# Patient Record
Sex: MALE | Race: WHITE
[De-identification: names, ages, dates, MRNs, and addresses within clinical notes are randomized per-mention and may not be internally consistent; named-entity substitution may affect disease eponyms.]

---

## 2018-05-06 ENCOUNTER — HOSPITAL ENCOUNTER (EMERGENCY)
Dept: HOSPITAL 17 - NEPD | Age: 28
Discharge: HOME | End: 2018-05-06
Payer: SELF-PAY

## 2018-05-06 VITALS
HEART RATE: 64 BPM | RESPIRATION RATE: 20 BRPM | DIASTOLIC BLOOD PRESSURE: 68 MMHG | OXYGEN SATURATION: 98 % | SYSTOLIC BLOOD PRESSURE: 119 MMHG | TEMPERATURE: 98.5 F

## 2018-05-06 DIAGNOSIS — F10.10: Primary | ICD-10-CM

## 2018-05-06 DIAGNOSIS — Z72.0: ICD-10-CM

## 2018-05-06 DIAGNOSIS — F12.90: ICD-10-CM

## 2018-05-06 PROCEDURE — 99283 EMERGENCY DEPT VISIT LOW MDM: CPT

## 2018-05-06 NOTE — PD
HPI


Chief Complaint:  Chronic alcohol abuse


Time Seen by Provider:  09:25


Travel History


International Travel<30 days:  No


Contact w/Intl Traveler<30days:  No


Traveled to known affect area:  No





History of Present Illness


HPI


27-year-old male came to the emergency room brought by EMS for "detox".  

Patient has been in this emergency room multiple times in past 2 weeks.  He is 

from [facility] and is here visiting his grandparents 

indefinitely.  He says he has been staying with some friends.  He drinks a lot 

and drink last night 15 beers.  Now he feels like he is "detoxing".  Patient 

was completely normal and answering questions appropriately.  Vital signs were 

stable.  Upon asking if he tried any other detox places besides coming to the 

emergency room he said he did not know any place here.  Patient is not 

suicidal.  He says he drinks heavily for past 8-9 years.  Patient appears 

somewhat disheveled.  No pain or any other complaints.





PFSH


Past Medical History


*** Narrative Medical


List of his past medical, surgical, social and family history reviewed from the 

nursing note.


Hx Anticoagulant Therapy:  No


Cardiovascular Problems:  No


Chemotherapy:  No


Cerebrovascular Accident:  No


Diabetes:  No


Diminished Hearing:  No


Hepatitis:  Yes (C)


Respiratory:  No





Social History


Alcohol Use:  Yes ( since a teenager. HX of  blackouts in the past)


Tobacco Use:  Yes


Substance Use:  Yes (marijauna "or anything that makes me feel better")





Allergies-Medications


(Allergen,Severity, Reaction):  


Coded Allergies:  


     haloperidol (Verified  Allergy, Severe, 5/2/18)


 "I can't swallow"


Comments


List of his allergies reviewed from the nursing note


Reported Meds & Prescriptions





Reported Meds & Active Scripts


Active


Active Prescriptions or Reported Medications Unobtainable    





Narrative Medication


List of his home medications reviewed from the nursing note





Review of Systems


Except as stated in HPI:  all other systems reviewed are Neg





Physical Exam


Narrative


GENERAL: Awake, alert, no obvious to


SKIN: Focused skin assessment warm/dry.  Peeling skin on the nose


HEAD: Atraumatic. Normocephalic. 


EYES: Pupils equal and round. No scleral icterus. No injection or drainage. 


ENT: No nasal bleeding or discharge.  Mucous membranes pink and moist.


NECK: Trachea midline. No JVD. 


CARDIOVASCULAR: Regular rate and rhythm.  No murmur appreciated.


RESPIRATORY: No accessory muscle use. Clear to auscultation. Breath sounds 

equal bilaterally. 


GASTROINTESTINAL: Abdomen soft, non-tender, nondistended. Hepatic and splenic 

margins not palpable. 


MUSCULOSKELETAL: No obvious deformities. No clubbing.  No cyanosis.  No edema. 


NEUROLOGICAL: Awake and alert. No obvious cranial nerve deficits.  Motor 

grossly within normal limits. Normal speech.


PSYCHIATRIC: Appropriate mood and affect; insight and judgment normal.





Data


Data


Last Documented VS





Orders





 Orders


Ed Discharge Order (5/6/18 09:31)








MDM


Medical Decision Making


Medical Screen Exam Complete:  Yes


Emergency Medical Condition:  Yes


Medical Record Reviewed:  Yes


Differential Diagnosis


Chronic alcohol abuse, requesting detox


Narrative Course


9:30 AM patient was recommended [facility].  I am medically clearing him.

  He will be discharged.





Procedures


EKG Prior to Arrival:  No





Diagnosis





 Primary Impression:  


 Chronic alcohol abuse





***Additional Instructions:  


Please check and to [facility] for alcohol detox.


Scripts


Unable to Obtain Active Prescriptions or Reported Meds


Disposition:  01 DISCHARGE HOME


Condition:  Stable











uJde Fagan MD May 6, 2018 09:25

## 2018-05-11 ENCOUNTER — HOSPITAL ENCOUNTER (EMERGENCY)
Dept: HOSPITAL 17 - NEDAMB | Age: 28
LOS: 1 days | Discharge: HOME | End: 2018-05-12
Payer: COMMERCIAL

## 2018-05-11 VITALS
RESPIRATION RATE: 18 BRPM | OXYGEN SATURATION: 97 % | HEART RATE: 90 BPM | TEMPERATURE: 98.5 F | DIASTOLIC BLOOD PRESSURE: 70 MMHG | SYSTOLIC BLOOD PRESSURE: 119 MMHG

## 2018-05-11 VITALS — BODY MASS INDEX: 23.15 KG/M2 | WEIGHT: 165.35 LBS | HEIGHT: 71 IN

## 2018-05-11 DIAGNOSIS — F10.24: Primary | ICD-10-CM

## 2018-05-11 DIAGNOSIS — F12.90: ICD-10-CM

## 2018-05-11 DIAGNOSIS — B19.20: ICD-10-CM
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
## 2018-05-11 PROCEDURE — 80307 DRUG TEST PRSMV CHEM ANLYZR: CPT

## 2018-05-11 PROCEDURE — 99283 EMERGENCY DEPT VISIT LOW MDM: CPT

## 2018-05-11 NOTE — PD
HPI


Chief Complaint:  Psychiatric Symptoms


Time Seen by Provider:  22:19


Travel History


International Travel<30 days:  No


Contact w/Intl Traveler<30days:  No


Traveled to known affect area:  No





History of Present Illness


HPI


This is a 27-year-old male presents under Baker act initiated by the Police 

Department.  According to his paperwork the patient was complaining of suicidal 

thoughts intermittently as well as concern for potential alcohol withdrawal as 

well as a negative reaction to ingesting marijuana.  The patient reports a 

history of hepatitis C, long-term alcohol use.  He is from [place], 

lives with his mother in a house there.  He reports that he traveled down to 

[place] 2.5 weeks ago in an effort to visit his grandparents in [place].  He 

reports that he does not get along with his grandparents and they are concerned 

with the amount that he is drinking and thus he is currently homeless and 

staying in [facility] but he plans on returning to [place] at some point.  

Today he reports that he was at the beach and he smoked some marijuana but he 

believes that the marijuana may have had some other substance.  He reports that 

it caused him to feel "weird" and have hallucinations.  He is also complaining 

of passive suicidal thoughts for several weeks as well as concerned that he may 

develop alcohol withdrawal symptoms.  He reports that today he drank 5 beers.  

He has been seen here 5 times in the past 2.5 weeks with similar complaints.  

No other complaints at this time.





PFSH


Past Medical History


Hx Anticoagulant Therapy:  No


Cardiovascular Problems:  No


Chemotherapy:  No


Cerebrovascular Accident:  No


Diabetes:  No


Diminished Hearing:  No


Hepatitis:  Yes (C)


Respiratory:  No





Social History


Alcohol Use:  Yes ( since a teenager. HX of  blackouts in the past)


Tobacco Use:  Yes


Substance Use:  Yes (marijauna "or anything that makes me feel better")





Allergies-Medications


(Allergen,Severity, Reaction):  


Coded Allergies:  


     haloperidol (Verified  Allergy, Severe, 5/2/18)


 "I can't swallow"


Reported Meds & Prescriptions





Reported Meds & Active Scripts


Active


Active Prescriptions or Reported Medications Unobtainable    








Review of Systems


Except as stated in HPI:  all other systems reviewed are Neg





Physical Exam


Narrative


GENERAL: Well-developed well-nourished male in no acute distress sitting 

upright in hospital chair in the "signal 20" room


SKIN: Warm and dry.


HEAD: Atraumatic. Normocephalic. 


EYES: Pupils equal and round. No scleral icterus. No injection or drainage. 


ENT: No nasal bleeding or discharge.  Mucous membranes pink and moist.


NECK: Trachea midline. No JVD. 


CARDIOVASCULAR: Regular rate and rhythm.  No murmur appreciated.


RESPIRATORY: No accessory muscle use. Clear to auscultation. Breath sounds 

equal bilaterally. 


GASTROINTESTINAL: Abdomen soft, non-tender, nondistended. Hepatic and splenic 

margins not palpable. 


MUSCULOSKELETAL: No obvious deformities. No clubbing.  No cyanosis.  No edema. 


NEUROLOGICAL: Awake and alert. No obvious cranial nerve deficits.  Motor 

grossly within normal limits. Normal speech.


PSYCHIATRIC: Appropriate mood and affect; insight and judgment normal.





Data


Data


Last Documented VS





Vital Signs








  Date Time  Temp Pulse Resp B/P (MAP) Pulse Ox O2 Delivery O2 Flow Rate FiO2


 


5/11/18 22:14 98.5 90 18 119/70 (86) 97   








Orders





 Orders


Psych Screen (5/11/18 22:20)


Drug Screen, Random Urine (5/11/18 22:20)


Alcohol (Ethanol) (5/11/18 22:20)








MDM


Medical Decision Making


Medical Screen Exam Complete:  Yes


Emergency Medical Condition:  Yes


Medical Record Reviewed:  Yes


Differential Diagnosis


Homelessness, malingering, alcohol dependence, polysubstance abuse, major 

depressive disorder, adjustment reaction, acute psychosis


Narrative Course


27-year-old male presents under [place] act for psychiatric evaluation.  I 

reviewed his previous visits.  I reviewed his lab work from his past visits.  

He has elevated liver enzymes secondary to hepatitis C.  Typically his alcohol 

level is elevated as well.  His lab work is otherwise unremarkable and will not 

be repeated today.  A drug screen and alcohol level have been ordered. CIWA 

precautions initiated.





Mental health screening discussed with the patient. Psychiatric screen ordered.





The patient is medically cleared for psychiatric disposition.





Diagnosis





 Primary Impression:  


 Medical clearance for psychiatric admission


 Additional Impression:  


 Alcoholism


Scripts


Unable to Obtain Active Prescriptions or Reported Meds











Matias Tapia May 11, 2018 22:24

## 2018-05-12 VITALS
RESPIRATION RATE: 18 BRPM | DIASTOLIC BLOOD PRESSURE: 57 MMHG | SYSTOLIC BLOOD PRESSURE: 112 MMHG | TEMPERATURE: 98.7 F | OXYGEN SATURATION: 99 % | HEART RATE: 54 BPM

## 2018-05-12 NOTE — PD
Physical Exam


Time Seen by Provider:  07:55


Narrative


Please refer to previous providers documentation for details surrounding the 

patient's current visit.





Data


Data


Last Documented VS





Vital Signs








  Date Time  Temp Pulse Resp B/P (MAP) Pulse Ox O2 Delivery O2 Flow Rate FiO2


 


5/12/18 06:49 98.7 54 18 112/57 (75) 99 Room Air  








Orders





 Orders


Psych Screen (5/11/18 22:20)


Drug Screen, Random Urine (5/11/18 22:20)


Alcohol (Ethanol) (5/11/18 22:20)


Alcohol Withdrawal Asmt-Ciwa ONCE (5/11/18 22:20)


Flumazenil Inj (Romazicon Inj) (5/11/18 22:30)


Lorazepam (Ativan) (5/11/18 22:30)


Lorazepam Inj (Ativan Inj) (5/11/18 22:30)


Lorazepam (Ativan) (5/11/18 22:30)


Lorazepam Inj (Ativan Inj) (5/11/18 22:30)


Diet Regular Basic (5/12/18 Breakfast)


Ed Discharge Order (5/12/18 07:54)





Labs





Laboratory Tests








Test


  5/11/18


22:30 5/11/18


23:25


 


Ethyl Alcohol Level 40 MG/DL  


 


Urine Opiates Screen  NEG 


 


Urine Barbiturates Screen  NEG 


 


Urine Amphetamines Screen  NEG 


 


Urine Benzodiazepines Screen  NEG 


 


Urine Cocaine Screen  NEG 


 


Urine Cannabinoids Screen  POS 











MDM


Medical Record Reviewed:  Yes


Supervised Visit with ZEESHAN:  No


Narrative Course


Patient has been seen and evaluated, medically clear, and evaluated by 

psychiatry.  With no further medical needs, patient is discharged at this time.


Diagnosis





 Primary Impression:  


 Alcohol-induced mood disorder


Referrals:  


[facility] (Out patient)


Patient Instructions:  Abuse of Alcohol (ED), General Instructions





***Additional Instruction:  


Follow-up with a primary care provider


Consume alcohol in moderation


Return immediately with acute worsening symptoms


Scripts


Unable to Obtain Active Prescriptions or Reported Meds


Disposition:  01 DISCHARGE HOME


Condition:  Stable











Deana Lopes CLIFTON May 12, 2018 07:56

## 2018-05-12 NOTE — PD
__________________________________________________





History of Present Illness


Chief Complaint:  Psychiatric Symptoms





Travel History


International Travel<30 Days:  No


Contact w/Intl Traveler<30days:  No


Known affected area:  No





Legal Status


Legal Status:  Baker Act


Baker Act Signed By:  Tri[facility]havna Driver





[facility]


Past Medical History


Hx Anticoagulant Therapy:  No


Cardiovascular Problems:  No


Chemotherapy:  No


Cerebrovascular Accident:  No


Diabetes:  No


Diminished Hearing:  No


Hepatitis:  Yes (C)


Respiratory:  No





Psychiatric History


Psychiatric History


Hx Psychiatric Treatment:  


Denies any. No previous  suiiced attempts.


History of Inpatient Treatment:  No





Social History


Hx Alcohol Use:  Yes ( since a teenager. HX of  blackouts in the past)


Hx Tobacco Use:  Yes


Hx Substance Use:  Yes


Substance Use Type:  Alcohol, Marijuana


Hx of Substance Use Treatment:  No





Allergies-Medications


(Allergen,Severity, Reaction):  


Coded Allergies:  


     haloperidol (Verified  Allergy, Severe, 5/2/18)


 "I can't swallow"


Reported Meds & Prescriptions





Reported Meds & Active Scripts


Active


Active Prescriptions or Reported Medications Unobtainable





Mental Status Examination


Appearance:  Disheveled


Consciousness:  Alert


Orientation:  x4


Motor Activity:  Normal gait


Speech:  Unremarkable


Language:  Adequate


Fund of Knowledge:  Adequate


Attention and Concentration:  Adequate


Memory:  Unremarkable


Mood:  Sad, Irritable


Affect:  Flat, Blunt


Thought Process & Associations:  Intact


Thought Content:  Appropriate


Hallucination Type:  None


Delusion Type:  None


Suicidal Ideation:  No


Suicidal Plan:  No


Suicidal Intention:  No


Homicidal Ideation:  No


Homicidal Plan:  No


Homicidal Intention:  No


Insight:  Adequate


Judgment:  Adequate





MDM


Orders





 Orders


Psych Screen (5/11/18 22:20)


Drug Screen, Random Urine (5/11/18 22:20)


Alcohol (Ethanol) (5/11/18 22:20)


Alcohol Withdrawal Asmt-Ciwa ONCE (5/11/18 22:20)


Flumazenil Inj (Romazicon Inj) (5/11/18 22:30)


Lorazepam (Ativan) (5/11/18 22:30)


Lorazepam Inj (Ativan Inj) (5/11/18 22:30)


Lorazepam (Ativan) (5/11/18 22:30)


Lorazepam Inj (Ativan Inj) (5/11/18 22:30)


Diet Regular Basic (5/12/18 Breakfast)





Results





Vital Signs








  Date Time  Temp Pulse Resp B/P (MAP) Pulse Ox O2 Delivery O2 Flow Rate FiO2


 


5/12/18 06:49 98.7 54 18 112/57 (75) 99 Room Air  


 


5/11/18 22:14 98.5 90 18 119/70 (86) 97   











Laboratory Tests








Test


  5/11/18


22:30 5/11/18


23:25


 


Ethyl Alcohol Level 40  


 


Urine Opiates Screen  NEG 


 


Urine Barbiturates Screen  NEG 


 


Urine Amphetamines Screen  NEG 


 


Urine Benzodiazepines Screen  NEG 


 


Urine Cocaine Screen  NEG 


 


Urine Cannabinoids Screen  POS 











Diagnosis





 Primary Impression:  


 Alcohol-induced mood disorder


Departure Forms:  Tests/Procedures


Patient Instructions:  General Instructions





Additional Instructions:  


Follow up with the [facility] at [address] [place] [address] [phone] for 

outpatient alcohol/drug treatment. Follow up at [facility] in [place] if inpatient detox is desired.


Prescriptions


Unable to Obtain Active Prescriptions or Reported Meds


Disposition:  01 DISCHARGE HOME


Condition:  Stable











Lolis Medrano May 12, 2018 08:00

## 2018-05-12 NOTE — PD
__________________________________________________





History of Present Illness


Chief Complaint:  Psychiatric Symptoms


Time Seen by Provider:  07:15


Travel History


International Travel<30 Days:  No


Contact w/Intl Traveler<30days:  No


Known affected area:  No





Legal Status


Legal Status:  Baker Act


Baker Act Signed By:  [facility] 


History of Present Illness:


Patient is a 26y/o  male , single, no children from [place]. 

Present to the Emergency Department under a Baker Act from [facility]'s Department, which states "made contact with Tasneem after he called 

into dispatch advising that he was suffering from the symptoms of alcohol 

withdrawal and having a negative reaction to recently ingesting marijuana.  

During contact with Tasneem, he advised to feeding pain that he was having 

suicidal thoughts on and off for a long time.  Tasneem advised that he is having 

complications from hepatitis C and that along with his addiction is causing him 

to have suicidal thoughts.  Deputy Walker asked Tasneem if he was left alone 

tonight, if he would perhaps kill himself.  Advise advised yes Tasneem was 

placed into protective custody under the Baker act and [facility]." Patient has been in  [place] for 2 1/2 weeks to visit his 

grandmother in [place].  He states that he has been drinking since the age of 

15 year of age and is drinking approximately 10 tall beers per day or more if 

he has access.  He has no mental health history. Endorses that his father is an 

alcoholic and lives local, but his parents to not want to help him.  He smokes, 

only drug is marijuana and is UDS is positive for marijuana.  He has been to 

[facility] for the assistance with his alcoholism x2 within the past two weeks 

prior to today.  He endorses no suicidal  or homicidal ideations at this time. 





Chart reviewed and patient discussed with CHE Amador.  Patient is in J-106. he 

is unkempt and disheveled. Alert and oriented .Gait is steady and normal.  

Attitude is cooperative. Speech is normal for rate, tone and volume.  Affect is 

sad, flat and blunted.  Mood is irritable. Thought process is normal and 

logical.  Thought association is relevant and he thinking is concrete.  Fund of 

knowledge is average . Concentration and attention is adequate.  Endorses no 

suicidal or homicidal ideations. 





Patient is at low risk for self harm.  He endorses that he is an alcoholic and 

that he wants to stop drinking.  He states that he has attempted to stop 

drinking in the past but is inconsistent in his efforts. He is knowledgeable 

about AA and the 12 step program.  He is articulate and is insight to what he 

needs to do to become sober.  Based on the patient's presentation and desire to 

become sober will give him information on local resources and lift the [place] 

Act. 





CHE Amador called [facility] to see if there is a Detox bed and there is no 

availability at this time. Will refer the patient to [facility] , [address] [place] [address] in [place] , [phone] and give him information 

regarding how to get on the list for Detox at [facility].  

















Dx:  Mood Disorder, Alcohol induced.





PFSH


Past Medical History


Hx Anticoagulant Therapy:  No


Cardiovascular Problems:  No


Chemotherapy:  No


Cerebrovascular Accident:  No


Diabetes:  No


Diminished Hearing:  No


Hepatitis:  Yes (C)


Respiratory:  No





Psychiatric History


Psychiatric History


No history of mental illness or care for mental illness.


Hx Psychiatric Treatment:  


Denies any. No previous  suiiced attempts.


History of Inpatient Treatment:  No





Social History


Hx Alcohol Use:  Yes ( since a teenager. HX of  blackouts in the past)


Hx Tobacco Use:  Yes


Hx Substance Use:  Yes


Substance Use Type:  Alcohol, Marijuana


Hx of Substance Use Treatment:  No





Allergies-Medications


(Allergen,Severity, Reaction):  


Coded Allergies:  


     haloperidol (Verified  Allergy, Severe, 5/2/18)


 "I can't swallow"


Reported Meds & Prescriptions





Reported Meds & Active Scripts


Active


Active Prescriptions or Reported Medications Unobtainable





Mental Status Examination


Appearance:  Disheveled


Consciousness:  Alert


Orientation:  x4


Motor Activity:  Normal gait


Speech:  Unremarkable


Language:  Adequate


Fund of Knowledge:  Adequate


Attention and Concentration:  Adequate


Memory:  Unremarkable


Mood:  Sad, Irritable


Affect:  Flat, Blunt


Thought Process & Associations:  Intact


Thought Content:  Appropriate


Hallucination Type:  None


Delusion Type:  None


Suicidal Ideation:  No


Suicidal Plan:  No


Suicidal Intention:  No


Homicidal Ideation:  No


Homicidal Plan:  No


Homicidal Intention:  No


Insight:  Adequate


Judgment:  Adequate





MDM


Medical Decision Making


Medical Record Reviewed:  Yes


Assessment/Plan


Patient is a 26 y/o male who has been in [place] for 2 [place]2 weeks from [place] 

[place]. He has a long history of alcohol abuse since age 15 and is seeking 

treatment .  He is currently drinking 10 tall beers per day.  He has parents 

locally and a grandmother in [place], but they are not longer supporting him.  

He has no history of mental illness. He is currently homeless.   He endorses no 

suicidal ideations and is seeking help to stop drinking.  At this time their is 

no Detox bed available. Patient is stable. Will lift the Bake Act and provide 

information on the [facility] Program to seek help as an outpatient until he can 

secure inpatient treatment.  He will be provided information on the Detox 

program at [facility] as well.





Orders





 Orders


Psych Screen (5/11/18 22:20)


Drug Screen, Random Urine (5/11/18 22:20)


Alcohol (Ethanol) (5/11/18 22:20)


Alcohol Withdrawal Asmt-Ciwa ONCE (5/11/18 22:20)


Flumazenil Inj (Romazicon Inj) (5/11/18 22:30)


Lorazepam (Ativan) (5/11/18 22:30)


Lorazepam Inj (Ativan Inj) (5/11/18 22:30)


Lorazepam (Ativan) (5/11/18 22:30)


Lorazepam Inj (Ativan Inj) (5/11/18 22:30)


Diet Regular Basic (5/12/18 Breakfast)





Results





Vital Signs








  Date Time  Temp Pulse Resp B/P (MAP) Pulse Ox O2 Delivery O2 Flow Rate FiO2


 


5/12/18 06:49 98.7 54 18 112/57 (75) 99 Room Air  


 


5/11/18 22:14 98.5 90 18 119/70 (86) 97   











Laboratory Tests








Test


  5/11/18


22:30 5/11/18


23:25


 


Ethyl Alcohol Level 40  


 


Urine Opiates Screen  NEG 


 


Urine Barbiturates Screen  NEG 


 


Urine Amphetamines Screen  NEG 


 


Urine Benzodiazepines Screen  NEG 


 


Urine Cocaine Screen  NEG 


 


Urine Cannabinoids Screen  POS 











Diagnosis





 Primary Impression:  


 Alcohol-induced mood disorder


Prescriptions


Unable to Obtain Active Prescriptions or Reported Meds











Lolis Medrano May 12, 2018 07:51

## 2018-06-04 ENCOUNTER — HOSPITAL ENCOUNTER (EMERGENCY)
Dept: HOSPITAL 17 - NEPD | Age: 28
LOS: 1 days | Discharge: HOME | End: 2018-06-05
Payer: SELF-PAY

## 2018-06-04 VITALS — BODY MASS INDEX: 23.89 KG/M2 | HEIGHT: 72 IN | WEIGHT: 176.37 LBS

## 2018-06-04 VITALS
HEART RATE: 65 BPM | TEMPERATURE: 98.3 F | RESPIRATION RATE: 16 BRPM | SYSTOLIC BLOOD PRESSURE: 119 MMHG | OXYGEN SATURATION: 99 % | DIASTOLIC BLOOD PRESSURE: 71 MMHG

## 2018-06-04 VITALS
HEART RATE: 71 BPM | OXYGEN SATURATION: 99 % | DIASTOLIC BLOOD PRESSURE: 75 MMHG | SYSTOLIC BLOOD PRESSURE: 133 MMHG | RESPIRATION RATE: 16 BRPM

## 2018-06-04 DIAGNOSIS — L03.115: Primary | ICD-10-CM

## 2018-06-04 DIAGNOSIS — F17.200: ICD-10-CM

## 2018-06-04 PROCEDURE — 86403 PARTICLE AGGLUT ANTBDY SCRN: CPT

## 2018-06-04 PROCEDURE — 99283 EMERGENCY DEPT VISIT LOW MDM: CPT

## 2018-06-04 PROCEDURE — 87186 SC STD MICRODIL/AGAR DIL: CPT

## 2018-06-04 PROCEDURE — 87070 CULTURE OTHR SPECIMN AEROBIC: CPT

## 2018-06-05 NOTE — PD
HPI


Chief Complaint:  Skin Problem


Time Seen by Provider:  23:55


Travel History


International Travel<30 days:  No


Contact w/Intl Traveler<30days:  No


Traveled to known affect area:  No





History of Present Illness


HPI


27-year-old male with right foot pain.  He reports that he wears sandals and he 

developed a blister on the dorsum of his right foot 1.5 weeks ago.  The blister 

popped and he has developed some redness and pain at the site of the blister.  

Pain is aching, constant, worse with palpation or when wearing shoes.  No 

relieving factors.  No fevers or chills.  No associated signs or symptoms.  No 

other complaints.





PFSH


Past Medical History


Hx Anticoagulant Therapy:  No


Cardiovascular Problems:  No


Chemotherapy:  No


Cerebrovascular Accident:  No


Diabetes:  No


Patient Takes Glucophage:  No


Diminished Hearing:  No


Hepatitis:  Yes (C)


Respiratory:  No


Immunizations Current:  Yes


Tetanus Vaccination:  < 5 Years


Influenza Vaccination:  No





Social History


Alcohol Use:  Yes ( since a teenager. HX of  blackouts in the past)


Tobacco Use:  Yes (1PPD)


Substance Use:  Yes





Allergies-Medications


(Allergen,Severity, Reaction):  


Coded Allergies:  


     haloperidol (Verified  Allergy, Severe, 6/4/18)


 "I can't swallow"


Reported Meds & Prescriptions





Reported Meds & Active Scripts


Active


Keflex (Cephalexin) 500 Mg Cap 500 Mg PO Q8H


Bactrim DS (Sulfamethoxazole-Trimethoprim) 800-160 Mg Tab 1 Tab PO BID








Review of Systems


Except as stated in HPI:  all other systems reviewed are Neg





Physical Exam


Narrative


GENERAL: Well-developed well-nourished male in no acute distress


SKIN: Warm and dry.  On the dorsum of the right foot there is an area of 

excoriation with some surrounding erythema.  There is some bloody drainage with 

palpation but no purulent drainage.  There is no induration or fluctuance.


HEAD: Atraumatic. Normocephalic. 


EYES: Pupils equal and round. No scleral icterus. No injection or drainage. 


ENT: No nasal bleeding or discharge.  Mucous membranes pink and moist.


NECK: Trachea midline. No JVD. 


CARDIOVASCULAR: Regular rate and rhythm.  No murmur appreciated.


RESPIRATORY: No accessory muscle use. Clear to auscultation. Breath sounds 

equal bilaterally. 


GASTROINTESTINAL: Abdomen soft, non-tender, nondistended. Hepatic and splenic 

margins not palpable. 


MUSCULOSKELETAL: No obvious deformities.  No edema. 


NEUROLOGICAL: Awake and alert. No obvious cranial nerve deficits.  Motor 

grossly within normal limits. Normal speech.





Data


Data


Last Documented VS





Vital Signs








  Date Time  Temp Pulse Resp B/P (MAP) Pulse Ox O2 Delivery O2 Flow Rate FiO2


 


6/4/18 23:55  71 16 133/75 (94) 99 Room Air  


 


6/4/18 19:31 98.3       








Orders





 Orders


Sulfamet-Trimeth Ds 800-160 Mg (Bactrim (6/5/18 00:00)


Cephalexin (Keflex) (6/5/18 00:00)


Ed Discharge Order (6/5/18 00:02)


Wound Culture And Gram Stain (6/5/18 00:04)








[facility]


Medical Decision Making


Medical Screen Exam Complete:  Yes


Emergency Medical Condition:  Yes


Medical Record Reviewed:  Yes


Differential Diagnosis


Cellulitis, abscess, osteomyelitis


Narrative Course


Examination is consistent with mild cellulitis on the dorsum right foot.  Wound 

culture performed.  The patient is being started on Bactrim and Keflex.  

Discussed signs and symptoms that would warrant returning to the emergency room.





Diagnosis





 Primary Impression:  


 Cellulitis of right foot





***Additional Instructions:  


Medication as prescribed.  Warm compresses several times a day.  Return for new 

or worsening symptoms.


***Med/Other Pt SpecificInfo:  Prescription(s) given


Scripts


Cephalexin (Keflex) 500 Mg Cap


500 MG PO Q8H for Infection, #30 CAP 0 Refills


   Prov: Jazmin Dias DO         6/5/18 


Sulfamethoxazole-Trimethoprim (Bactrim DS) 800-160 Mg Tab


1 TAB PO BID for Infection, #20 TAB 0 Refills


   Prov: Jazmin Dias DO         6/5/18


Disposition:  01 DISCHARGE HOME


Condition:  Stable











Matias Tapia Jun 5, 2018 00:20

## 2018-06-20 ENCOUNTER — HOSPITAL ENCOUNTER (EMERGENCY)
Dept: HOSPITAL 17 - NEPD | Age: 28
LOS: 2 days | Discharge: HOME | End: 2018-06-22
Payer: SELF-PAY

## 2018-06-20 VITALS
RESPIRATION RATE: 16 BRPM | TEMPERATURE: 98 F | OXYGEN SATURATION: 100 % | SYSTOLIC BLOOD PRESSURE: 137 MMHG | HEART RATE: 87 BPM | DIASTOLIC BLOOD PRESSURE: 77 MMHG

## 2018-06-20 VITALS
OXYGEN SATURATION: 100 % | DIASTOLIC BLOOD PRESSURE: 84 MMHG | HEART RATE: 92 BPM | SYSTOLIC BLOOD PRESSURE: 130 MMHG | RESPIRATION RATE: 18 BRPM

## 2018-06-20 VITALS — RESPIRATION RATE: 16 BRPM

## 2018-06-20 VITALS — HEIGHT: 60 IN | WEIGHT: 176.37 LBS | BODY MASS INDEX: 34.63 KG/M2

## 2018-06-20 DIAGNOSIS — F60.9: ICD-10-CM
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
## 2018-06-20 DIAGNOSIS — F10.229: ICD-10-CM

## 2018-06-20 DIAGNOSIS — B19.20: ICD-10-CM

## 2018-06-20 DIAGNOSIS — Z59.0: ICD-10-CM

## 2018-06-20 DIAGNOSIS — F17.200: ICD-10-CM

## 2018-06-20 DIAGNOSIS — F15.229: Primary | ICD-10-CM

## 2018-06-20 DIAGNOSIS — R45.851: ICD-10-CM

## 2018-06-20 DIAGNOSIS — F32.9: ICD-10-CM

## 2018-06-20 LAB
ALBUMIN SERPL-MCNC: 4.5 GM/DL (ref 3.4–5)
ALP SERPL-CCNC: 74 U/L (ref 45–117)
ALT SERPL-CCNC: 280 U/L (ref 12–78)
AST SERPL-CCNC: 148 U/L (ref 15–37)
BASOPHILS # BLD AUTO: 0 TH/MM3 (ref 0–0.2)
BASOPHILS NFR BLD: 0.2 % (ref 0–2)
BILIRUB SERPL-MCNC: 0.6 MG/DL (ref 0.2–1)
BUN SERPL-MCNC: 7 MG/DL (ref 7–18)
CALCIUM SERPL-MCNC: 9.4 MG/DL (ref 8.5–10.1)
CHLORIDE SERPL-SCNC: 99 MEQ/L (ref 98–107)
CREAT SERPL-MCNC: 0.94 MG/DL (ref 0.6–1.3)
EOSINOPHIL # BLD: 0.1 TH/MM3 (ref 0–0.4)
EOSINOPHIL NFR BLD: 1 % (ref 0–4)
ERYTHROCYTE [DISTWIDTH] IN BLOOD BY AUTOMATED COUNT: 14.4 % (ref 11.6–17.2)
GFR SERPLBLD BASED ON 1.73 SQ M-ARVRAT: 96 ML/MIN (ref 89–?)
GLUCOSE SERPL-MCNC: 86 MG/DL (ref 74–106)
GLUCOSE UR STRIP-MCNC: (no result) MG/DL
HCO3 BLD-SCNC: 22.2 MEQ/L (ref 21–32)
HCT VFR BLD CALC: 39.9 % (ref 39–51)
HGB BLD-MCNC: 13.4 GM/DL (ref 13–17)
HGB UR QL STRIP: (no result)
KETONES UR STRIP-MCNC: (no result) MG/DL
LYMPHOCYTES # BLD AUTO: 2.1 TH/MM3 (ref 1–4.8)
LYMPHOCYTES NFR BLD AUTO: 29.2 % (ref 9–44)
MCH RBC QN AUTO: 31.5 PG (ref 27–34)
MCHC RBC AUTO-ENTMCNC: 33.6 % (ref 32–36)
MCV RBC AUTO: 93.6 FL (ref 80–100)
MONOCYTE #: 0.5 TH/MM3 (ref 0–0.9)
MONOCYTES NFR BLD: 6.8 % (ref 0–8)
NEUTROPHILS # BLD AUTO: 4.4 TH/MM3 (ref 1.8–7.7)
NEUTROPHILS NFR BLD AUTO: 62.8 % (ref 16–70)
NITRITE UR QL STRIP: (no result)
PLATELET # BLD: 216 TH/MM3 (ref 150–450)
PMV BLD AUTO: 7.6 FL (ref 7–11)
PROT SERPL-MCNC: 8.6 GM/DL (ref 6.4–8.2)
RBC # BLD AUTO: 4.26 MIL/MM3 (ref 4.5–5.9)
SODIUM SERPL-SCNC: 133 MEQ/L (ref 136–145)
SP GR UR STRIP: 1 (ref 1–1.03)
URINE LEUKOCYTE ESTERASE: (no result)
WBC # BLD AUTO: 7 TH/MM3 (ref 4–11)

## 2018-06-20 PROCEDURE — 80053 COMPREHEN METABOLIC PANEL: CPT

## 2018-06-20 PROCEDURE — 84443 ASSAY THYROID STIM HORMONE: CPT

## 2018-06-20 PROCEDURE — 85025 COMPLETE CBC W/AUTO DIFF WBC: CPT

## 2018-06-20 PROCEDURE — 99283 EMERGENCY DEPT VISIT LOW MDM: CPT

## 2018-06-20 PROCEDURE — 96372 THER/PROPH/DIAG INJ SC/IM: CPT

## 2018-06-20 PROCEDURE — 80307 DRUG TEST PRSMV CHEM ANLYZR: CPT

## 2018-06-20 PROCEDURE — 81001 URINALYSIS AUTO W/SCOPE: CPT

## 2018-06-20 SDOH — ECONOMIC STABILITY - HOUSING INSECURITY: HOMELESSNESS: Z59.0

## 2018-06-20 NOTE — PD
HPI


Chief Complaint:  Suicide Ideation/Attempt


Time Seen by Provider:  13:10


Travel History


International Travel<30 days:  No


Contact w/Intl Traveler<30days:  No


Traveled to known affect area:  No





History of Present Illness


HPI


27-year-old  male presents emergency department voluntarily with 

history of drinking alcohol and smoking meth for the past week.  Patient now 

feels he is hallucinating, and feeling suicidal or homicidal.  Patient denies 

any illness or medical issues otherwise.  He is here for help and wants to get 

clean.  He has no specific plan.  He is cooperative.  He is allergic to 

haloperidol





PFSH


Past Medical History


Hx Anticoagulant Therapy:  No


Cardiovascular Problems:  No


Chemotherapy:  No


Cerebrovascular Accident:  No


Diabetes:  No


Diminished Hearing:  No


Hepatitis:  Yes (C)


Respiratory:  No


Immunizations Current:  Yes





Social History


Alcohol Use:  Yes ( since a teenager. HX of  blackouts in the past)


Tobacco Use:  Yes (1PPD)


Substance Use:  Yes





Allergies-Medications


(Allergen,Severity, Reaction):  


Coded Allergies:  


     haloperidol (Verified  Allergy, Severe, 6/4/18)


 "I can't swallow"


Reported Meds & Prescriptions





Reported Meds & Active Scripts


Active


Keflex (Cephalexin) 500 Mg Cap 500 Mg PO Q8H


Bactrim DS (Sulfamethoxazole-Trimethoprim) 800-160 Mg Tab 1 Tab PO BID








Review of Systems


Except as stated in HPI:  all other systems reviewed are Neg


General / Constitutional:  No: Fever


Eyes:  No: Visual changes


HENT:  No: Headaches


Cardiovascular:  No: Chest Pain or Discomfort


Respiratory:  No: Shortness of Breath


Gastrointestinal:  No: Abdominal Pain


Genitourinary:  No: Dysuria


Musculoskeletal:  No: Pain


Skin:  No Rash


Neurologic:  No: Weakness


Psychiatric:  No: Depression


Endocrine:  No: Polydipsia


Hematologic/Lymphatic:  No: Easy Bruising





Physical Exam


Narrative


GENERAL: Patient is alert and oriented 3, and cooperative.


SKIN: Warm and dry.  Normal color.  Normal turgor.  No signs of trauma or drug 

use.


HEAD: Atraumatic. Normocephalic. 


EYES: Pupils equal and round. No scleral icterus. No injection or drainage. 


ENT: No nasal bleeding or discharge.  Mucous membranes pink and moist.  Pharynx 

is clear.  Airways patent


NECK: Trachea midline.  Supple and nontender.


CARDIOVASCULAR: Regular rate and rhythm.  No murmurs gallops or rubs.


RESPIRATORY: No accessory muscle use. Clear to auscultation. Breath sounds 

equal bilaterally. 


GASTROINTESTINAL: Abdomen soft, non-tender, nondistended. Hepatic and splenic 

margins not palpable. 


MUSCULOSKELETAL: Extremities without clubbing, cyanosis, or edema. No obvious 

deformities. 


NEUROLOGICAL: Awake and alert. No obvious cranial nerve deficits.  Motor 

grossly within normal limits. Five out of 5 muscle strength in the arms and 

legs.  Normal speech.


PSYCHIATRIC: Appropriate mood and affect; insight and judgment normal.





Data


Data


Last Documented VS





Vital Signs








  Date Time  Temp Pulse Resp B/P (MAP) Pulse Ox O2 Delivery O2 Flow Rate FiO2


 


6/20/18 13:00 98.0 87 16 137/77 (97) 100   








Orders





 Orders


Complete Blood Count With Diff (6/20/18 13:10)


Comprehensive Metabolic Panel (6/20/18 13:10)


Thyroid Stimulating Hormone (6/20/18 13:10)


Urinalysis - C+S If Indicated (6/20/18 13:10)


Psych Screen (6/20/18 13:10)


Drug Screen, Random Urine (6/20/18 13:10)


Alcohol (Ethanol) (6/20/18 13:10)





MDM


Medical Decision Making


Medical Screen Exam Complete:  Yes


Emergency Medical Condition:  Yes


Medical Record Reviewed:  Yes


Differential Diagnosis


Polysubstance abuse.  Suicidal ideation.  Hallucination.


Narrative Course


Patient is cooperative.


Labs ordered per psychiatric protocol.


Patient is medically cleared for psychiatric evaluation.


Psych screen is ordered.











Benton Michael Jun 20, 2018 13:22

## 2018-06-21 VITALS
DIASTOLIC BLOOD PRESSURE: 61 MMHG | RESPIRATION RATE: 18 BRPM | SYSTOLIC BLOOD PRESSURE: 118 MMHG | HEART RATE: 86 BPM | OXYGEN SATURATION: 100 %

## 2018-06-21 VITALS
OXYGEN SATURATION: 98 % | SYSTOLIC BLOOD PRESSURE: 94 MMHG | RESPIRATION RATE: 16 BRPM | DIASTOLIC BLOOD PRESSURE: 47 MMHG | HEART RATE: 56 BPM

## 2018-06-21 VITALS
RESPIRATION RATE: 16 BRPM | OXYGEN SATURATION: 99 % | HEART RATE: 50 BPM | DIASTOLIC BLOOD PRESSURE: 53 MMHG | SYSTOLIC BLOOD PRESSURE: 112 MMHG

## 2018-06-21 VITALS
OXYGEN SATURATION: 99 % | RESPIRATION RATE: 18 BRPM | HEART RATE: 68 BPM | SYSTOLIC BLOOD PRESSURE: 95 MMHG | DIASTOLIC BLOOD PRESSURE: 50 MMHG

## 2018-06-21 VITALS
HEART RATE: 55 BPM | TEMPERATURE: 97 F | RESPIRATION RATE: 16 BRPM | OXYGEN SATURATION: 100 % | SYSTOLIC BLOOD PRESSURE: 98 MMHG | DIASTOLIC BLOOD PRESSURE: 50 MMHG

## 2018-06-22 ENCOUNTER — HOSPITAL ENCOUNTER (EMERGENCY)
Dept: HOSPITAL 17 - NED | Age: 28
Discharge: LEFT BEFORE DISCHARGE | End: 2018-06-22
Payer: SELF-PAY

## 2018-06-22 VITALS — WEIGHT: 165.35 LBS | BODY MASS INDEX: 22.4 KG/M2 | HEIGHT: 72 IN

## 2018-06-22 VITALS
SYSTOLIC BLOOD PRESSURE: 116 MMHG | DIASTOLIC BLOOD PRESSURE: 66 MMHG | HEART RATE: 99 BPM | OXYGEN SATURATION: 97 % | TEMPERATURE: 98.4 F | RESPIRATION RATE: 15 BRPM

## 2018-06-22 VITALS
DIASTOLIC BLOOD PRESSURE: 60 MMHG | RESPIRATION RATE: 18 BRPM | SYSTOLIC BLOOD PRESSURE: 94 MMHG | HEART RATE: 63 BPM | OXYGEN SATURATION: 98 %

## 2018-06-22 DIAGNOSIS — F99: Primary | ICD-10-CM

## 2018-06-22 PROCEDURE — 99281 EMR DPT VST MAYX REQ PHY/QHP: CPT

## 2018-06-22 NOTE — PD.PSY.CON
Provisional Diagnosis


Admission Date





Axis I.


Polysubstance dependence including alcohol, cannabis, amphetamines, history of 

depression


Axis II.


Unspecified personality disorder, rule out antisocial


Axis III.


Hepatitis C





History of Present Illness


Service


Psychiatry


Consult Requested By


ER


Reason for Consult


Suicidal ideation


Primary Care Physician


No Primary Care Physician


HPI


The patient is 27-year-old  man, homeless, with poor family and social 

support, unemployed, with psychiatric history of polysubstance dependence 

including alcohol, cannabis, amphetamines, previous psychiatric hospitalizations

, multiple ER visits in the context of acute substance intoxication, medical 

history of hepatitis C, who presents emergency department voluntarily with 

history of drinking alcohol and smoking meth for the past week.  Patient now 

feels he is hallucinating, and feeling suicidal or homicidal.  Patient denies 

any illness or medical issues otherwise.  He is here for help and wants to get 

clean.  He has no specific plan.  He is cooperative.  He is allergic to 

haloperidol.  EMR was reviewed.  Case discussed with ER team.  On psychiatric 

evaluation today the patient is clinically sober, requesting to be transferred 

to detox.  He denies depressive symptoms, denies anxiety, denies bear, denies 

psychosis, he denies suicidal enemas ideation, he denies visual and auditory 

hallucinations.  He is logical, coherent, goal-directed.  No withdrawal observed





Review of Systems


Constitutional:  DENIES: Diaphoretic episodes, Fatigue, Fever, Weight gain, 

Weight loss, Chills, Dizziness, Change in appetite, Night Sweats


Endocrine:  DENIES: Heat/cold intolerance, Polydipsia, Polyuria, Polyphagia


Eyes:  DENIES: Blurred vision, Diplopia, Eye inflammation, Eye pain, Vision loss

, Photosensitivity, Double Vision


Ears, nose, mouth, throat:  DENIES: Tinnitus, Hearing loss, Vertigo, Nasal 

discharge, Oral lesions, Throat pain, Hoarseness, Ear Pain, Running Nose, 

Epistaxis, Sinus Pain, Toothache, Odynophagia


Cardiovascular:  DENIES: Chest pain, Palpitations, Syncope, Dyspnea on Exertion

, PND, Lower Extremity Edema, Orthopnea, Claudication


Gastrointestinal:  DENIES: Abdominal pain, Black stools, Bloody stools, 

Constipation, Diarrhea, Nausea, Vomiting, Difficulty Swallowing, Anorexia


Genitourinary:  DENIES: Sexual dysfunction, Urinary frequency, Urinary 

incontinence, Urgency, Hematuria, Dysuria, Nocturia, Penile Discharge, 

Testicular Pain, Testicular Swelling


Musculoskeletal:  DENIES: Joint pain, Muscle aches, Stiffness, Joint Swelling, 

Back pain, Neck pain


Integumentary:  DENIES: Abnormal pigmentation, Nail changes, Pruritus, Rash


Hematologic/lymphatic:  DENIES: Bruising, Lymphadenopathy


Immunologic/allergic:  DENIES: Eczema, Urticaria


Neurologic:  DENIES: Abnormal gait, Headache, Localized weakness, Paresthesias, 

Seizures, Speech Problems, Tremor, Poor Balance


Psychiatric:  DENIES: Anxiety, Confusion, Mood changes, Depression, 

Hallucinations, Agitation, Suicidal Ideation, Homicidal Ideation, Delusions





Past Family Social History


Coded Allergies:  


     haloperidol (Verified  Allergy, Severe, 6/20/18)


 "I can't swallow"


Discontinued Scripts


Cephalexin (Keflex) 500 Mg Cap, 500 MG PO Q8H for Infection, #30 CAP 0 Refills


   Prov:DiasJazmin valverde          6/5/18


Sulfamethoxazole-Trimethoprim (Bactrim DS) 800-160 Mg Tab, 1 TAB PO BID for 

Infection, #20 TAB 0 Refills


   Prov:Jazmin Dias DO         6/5/18


Family Psych History


No family psychiatric history


Social History


Patient was born and raised in [place], he is homeless, unemployed, 

single, highest level of education is 11th grade





Physical Exam


Vital Signs





Vital Signs








  Date Time  Temp Pulse Resp B/P (MAP) Pulse Ox O2 Delivery O2 Flow Rate FiO2


 


6/22/18 09:51        


 


6/22/18 03:00  63 18  98 Room Air  


 


6/21/18 06:52 97.0       











Mental Status Examination


Appearance:  Appropriate


Consciousness:  Alert


Orientation:  x4


Motor Activity:  Normal gait


Speech:  Unremarkable


Language:  Adequate


Fund of Knowledge:  Adequate


Attention and Concentration:  Adequate


Memory:  Unremarkable


Mood:  Appropriate


Affect:  Appropriate


Thought Process & Associations:  Intact


Thought Content:  Appropriate


Hallucination Type:  None


Delusion Type:  None


Suicidal Ideation:  No


Suicidal Plan:  No


Suicidal Intention:  No


Homicidal Ideation:  No


Homicidal Plan:  No


Homicidal Intention:  No


Insight:  Adequate


Judgment:  Adequate





Assessment & Plan


Problem List:  


(1) Polysubstance dependence


ICD Codes:  F19.20 - Other psychoactive substance dependence, uncomplicated


Assessment & Plan:  At the moment of the psychiatric evaluation the patient 

does not present any neuropsychiatric symptoms that require an immediate 

psychiatric intervention.  The patient denies symptomatology of depression, 

anxiety, bear and psychosis.  He is now clinically sober, denies suicidal and 

homicidal ideation, he denies visual and auditory hallucinations.  The patient 

reported psychosis was most probably the result of acute multiple substance 

intoxication including amphetamines and alcohol and no secondary to a primary 

psychotic disorder.  During my evaluation and based on EMR review, there are 

several findings inpatient history to suggest a cluster B personality 

pathology.  He does not meet criteria for involuntary psychiatric admission.  

Will be referred to [facility] for detox. 





Assessment & Plan


Estimated LOS:  days











Valdo Camacho MD Jun 22, 2018 16:04

## 2018-06-22 NOTE — PD
Physical Exam


Date Seen by Provider:  Jun 22, 2018


Time Seen by Provider:  09:24


Narrative


27-year-old male previously medically cleared for psychiatric evaluation under 

voluntary basis, has been seen by psychiatric staff and deemed psychiatrically 

stable for discharge at this time.  Patient remains medically stable for 

discharge at this time.  Follow-up will be based on psychiatric note.





Data


Data


Last Documented VS





Vital Signs








  Date Time  Temp Pulse Resp B/P (MAP) Pulse Ox O2 Delivery O2 Flow Rate FiO2


 


6/22/18 03:00  63 18 94/60 (71) 98 Room Air  


 


6/21/18 06:52 97.0       








Orders





 Orders


Complete Blood Count With Diff (6/20/18 13:10)


Comprehensive Metabolic Panel (6/20/18 13:10)


Thyroid Stimulating Hormone (6/20/18 13:10)


Urinalysis - C+S If Indicated (6/20/18 13:10)


Psych Screen (6/20/18 13:10)


Drug Screen, Random Urine (6/20/18 13:10)


Alcohol (Ethanol) (6/20/18 13:10)


Diet Regular Basic (6/20/18 Dinner)


Lorazepam (Ativan) (6/20/18 16:45)


Ibuprofen (Motrin) (6/20/18 17:45)


Lorazepam (Ativan) (6/20/18 18:45)


Diphenhydramine (Benadryl) (6/20/18 19:15)


Olanzapine Inj (Zyprexa Inj) (6/20/18 20:15)


Diet Regular Basic (6/21/18 Breakfast)


Diet Regular Basic (6/21/18 Lunch)


Diet Regular Basic (6/21/18 Dinner)


Alcohol Withdrawal Asmt-Ciwa ONCE (6/21/18 16:25)


Lorazepam (Ativan) (6/21/18 16:30)


Lorazepam Inj (Ativan Inj) (6/21/18 16:30)


Lorazepam (Ativan) (6/21/18 16:30)


Lorazepam Inj (Ativan Inj) (6/21/18 16:30)


Diet Regular Basic (6/22/18 Breakfast)





Labs





Laboratory Tests








Test


  6/20/18


13:35


 


White Blood Count 7.0 TH/MM3 


 


Red Blood Count 4.26 MIL/MM3 


 


Hemoglobin 13.4 GM/DL 


 


Hematocrit 39.9 % 


 


Mean Corpuscular Volume 93.6 FL 


 


Mean Corpuscular Hemoglobin 31.5 PG 


 


Mean Corpuscular Hemoglobin


Concent 33.6 % 


 


 


Red Cell Distribution Width 14.4 % 


 


Platelet Count 216 TH/MM3 


 


Mean Platelet Volume 7.6 FL 


 


Neutrophils (%) (Auto) 62.8 % 


 


Lymphocytes (%) (Auto) 29.2 % 


 


Monocytes (%) (Auto) 6.8 % 


 


Eosinophils (%) (Auto) 1.0 % 


 


Basophils (%) (Auto) 0.2 % 


 


Neutrophils # (Auto) 4.4 TH/MM3 


 


Lymphocytes # (Auto) 2.1 TH/MM3 


 


Monocytes # (Auto) 0.5 TH/MM3 


 


Eosinophils # (Auto) 0.1 TH/MM3 


 


Basophils # (Auto) 0.0 TH/MM3 


 


CBC Comment DIFF FINAL 


 


Differential Comment  


 


Urine Color Straw 


 


Urine Turbidity CLEAR 


 


Urine pH 6.0 


 


Urine Specific Gravity 1.004 


 


Urine Protein NEG mg/dL 


 


Urine Glucose (UA) NEG mg/dL 


 


Urine Ketones NEG mg/dL 


 


Urine Occult Blood NEG 


 


Urine Nitrite NEG 


 


Urine Bilirubin NEG 


 


Urine Urobilinogen


  LESS THAN 2


mg/dL


 


Urine Leukocyte Esterase NEG 


 


Urine RBC


  LESS THAN 1


/hpf


 


Urine WBC 2 /hpf 


 


Microscopic Urinalysis Comment


  CULT NOT


INDICATED


 


Blood Urea Nitrogen 7 MG/DL 


 


Creatinine 0.94 MG/DL 


 


Random Glucose 86 MG/DL 


 


Total Protein 8.6 GM/DL 


 


Albumin 4.5 GM/DL 


 


Calcium Level 9.4 MG/DL 


 


Alkaline Phosphatase 74 U/L 


 


Aspartate Amino Transf


(AST/SGOT) 148 U/L 


 


 


Alanine Aminotransferase


(ALT/SGPT) 280 U/L 


 


 


Total Bilirubin 0.6 MG/DL 


 


Sodium Level 133 MEQ/L 


 


Potassium Level 3.6 MEQ/L 


 


Chloride Level 99 MEQ/L 


 


Carbon Dioxide Level 22.2 MEQ/L 


 


Anion Gap 12 MEQ/L 


 


Estimat Glomerular Filtration


Rate 96 ML/MIN 


 


 


Thyroid Stimulating Hormone


3rd Gen 1.020 uIU/ML 


 


 


Urine Opiates Screen NEG 


 


Urine Barbiturates Screen NEG 


 


Urine Amphetamines Screen POS 


 


Urine Benzodiazepines Screen NEG 


 


Urine Cocaine Screen NEG 


 


Urine Cannabinoids Screen POS 


 


Ethyl Alcohol Level 31 MG/DL 











[facility]


Medical Record Reviewed:  Yes


Supervised Visit with ZEESHAN:  Yes


Narrative Course


27-year-old male previously medically cleared for psychiatric evaluation under 

voluntary basis, has been seen by psychiatric staff and deemed psychiatrically 

stable for discharge at this time.  Patient remains medically stable for 

discharge at this time.  Follow-up will be based on psychiatric note.


Diagnosis





 Primary Impression:  


 Polysubstance abuse


Referrals:  


[facility]


Patient Instructions:  General Instructions


Scripts


No Active Prescriptions or Reported Meds


Disposition:  01 DISCHARGE HOME


Condition:  Stable











Benton Michael Jun 22, 2018 09:26